# Patient Record
Sex: MALE | Race: WHITE
[De-identification: names, ages, dates, MRNs, and addresses within clinical notes are randomized per-mention and may not be internally consistent; named-entity substitution may affect disease eponyms.]

---

## 2018-12-11 ENCOUNTER — HOSPITAL ENCOUNTER (EMERGENCY)
Dept: HOSPITAL 89 - ER | Age: 33
Discharge: HOME | End: 2018-12-11
Payer: SELF-PAY

## 2018-12-11 VITALS — DIASTOLIC BLOOD PRESSURE: 84 MMHG | SYSTOLIC BLOOD PRESSURE: 110 MMHG

## 2018-12-11 DIAGNOSIS — R00.2: Primary | ICD-10-CM

## 2018-12-11 LAB — PLATELET COUNT, AUTOMATED: 386 K/UL (ref 150–450)

## 2018-12-11 PROCEDURE — 84460 ALANINE AMINO (ALT) (SGPT): CPT

## 2018-12-11 PROCEDURE — 84520 ASSAY OF UREA NITROGEN: CPT

## 2018-12-11 PROCEDURE — 85025 COMPLETE CBC W/AUTO DIFF WBC: CPT

## 2018-12-11 PROCEDURE — 82040 ASSAY OF SERUM ALBUMIN: CPT

## 2018-12-11 PROCEDURE — 82947 ASSAY GLUCOSE BLOOD QUANT: CPT

## 2018-12-11 PROCEDURE — 84155 ASSAY OF PROTEIN SERUM: CPT

## 2018-12-11 PROCEDURE — 82247 BILIRUBIN TOTAL: CPT

## 2018-12-11 PROCEDURE — 71046 X-RAY EXAM CHEST 2 VIEWS: CPT

## 2018-12-11 PROCEDURE — 82374 ASSAY BLOOD CARBON DIOXIDE: CPT

## 2018-12-11 PROCEDURE — 84132 ASSAY OF SERUM POTASSIUM: CPT

## 2018-12-11 PROCEDURE — 84295 ASSAY OF SERUM SODIUM: CPT

## 2018-12-11 PROCEDURE — 82310 ASSAY OF CALCIUM: CPT

## 2018-12-11 PROCEDURE — 93005 ELECTROCARDIOGRAM TRACING: CPT

## 2018-12-11 PROCEDURE — 84075 ASSAY ALKALINE PHOSPHATASE: CPT

## 2018-12-11 PROCEDURE — 84484 ASSAY OF TROPONIN QUANT: CPT

## 2018-12-11 PROCEDURE — 84450 TRANSFERASE (AST) (SGOT): CPT

## 2018-12-11 PROCEDURE — 82435 ASSAY OF BLOOD CHLORIDE: CPT

## 2018-12-11 PROCEDURE — 99284 EMERGENCY DEPT VISIT MOD MDM: CPT

## 2018-12-11 PROCEDURE — 85379 FIBRIN DEGRADATION QUANT: CPT

## 2018-12-11 PROCEDURE — 82565 ASSAY OF CREATININE: CPT

## 2018-12-11 NOTE — ER REPORT
History and Physical


Time Seen By MD:  13:42


HPI/ROS


CHIEF COMPLAINT: Heart fluttering





HISTORY OF PRESENT ILLNESS: This is a 33-year-old male who presents to the 


emergency Department for heart fluttering. Patient states over the last 3 days 


he's noticed his left anterior chest he's had heart fluttering, no associated 


shortness of breath or chest pain. He is not currently taking any stimulants, 


has cut out coffee, became concerned that this is increasing in frequency, 


therefore decided to come in for further evaluation. No recent fevers or chills.


No nausea or vomiting. No diaphoresis. No visual changes or rashes. No 


headaches. The patient is also an over the road .





REVIEW OF SYSTEMS:


Constitutional: No fever, no chills.


Eyes: No discharge.


ENT: No sore throat. 


Cardiovascular: As above.


Respiratory: No cough, no shortness of breath.


Gastrointestinal: No abdominal pain, no vomiting.


Genitourinary: No hematuria.


Musculoskeletal: No back pain.


Skin: No rashes.


Neurological: No headache.


Allergies:  


Coded Allergies:  


     lactose (Verified  Allergy, Intermediate, diarrhea, 12/11/18)


   


   lactose intolerance


Past Medical/Surgical History


The patient has no significant past medical or surgical history.


Reviewed Nurses Notes:  Yes


Constitutional





Vital Sign - Last 24 Hours








 12/11/18 12/11/18 12/11/18 12/11/18





 13:44 13:44 13:49 13:53


 


Pulse  90 89 


 


Resp   6 


 


B/P (MAP) 147/95 (112) 147/95  


 


Pulse Ox  95 95 


 


O2 Delivery  Nasal Cannula  


 


O2 Flow Rate    2.0





 12/11/18 12/11/18 12/11/18 12/11/18





 14:00 14:04 14:30 14:34


 


Pulse  92  85


 


Resp  15  7


 


B/P (MAP) 141/90 (107)  127/73 (91) 


 


Pulse Ox  93  95





 12/11/18 12/11/18 12/11/18 12/11/18





 14:39 14:49 15:00 15:04


 


Pulse  90  87


 


Resp  11  11


 


B/P (MAP) 130/86 (101)  110/84 (93) 


 


Pulse Ox  94  95





 12/11/18   





 15:19   


 


Pulse 84   


 


Resp 26   


 


Pulse Ox 95   








Physical Exam


   General Appearance: The patient is alert, has no immediate need for airway 


protection and no signs of toxicity. 


Eyes: Pupils equal and round no pallor or injection.


ENT, Mouth: Mucous membranes are moist.


Respiratory: There are no retractions, lungs are clear to auscultation.


Cardiovascular: Regular rate and rhythm, no murmurs, clicks or rubs.


Gastrointestinal:  Abdomen is soft and non tender, no masses, bowel sounds sybil


l.


Neurological: Alert and oriented 4. Moving all extremities. Following all 


commands. No focal neuro deficits.


Skin: Warm and dry, no rashes.


Musculoskeletal:  Neck is supple non tender.


      Extremities are nontender, nonswollen and have full range of motion.





DIFFERENTIAL DIAGNOSIS: After history and physical exam differential diagnosis 


was considered for chest pain including but not limited to myocardial ischemia, 


pericarditis pulmonary embolus, chest wall pain, pleural inflammation and pu


lmonary infectious causes.





Medical Decision Making


Data Points


Result Diagram:  


12/11/18 1409                                                                   


            12/11/18 1409





Laboratory





Hematology








Test


 12/11/18


14:09


 


Red Blood Count


 5.33 M/uL


(4.00-5.60)


 


Mean Corpuscular Volume


 82.4 fL


(80.0-96.0)


 


Mean Corpuscular Hemoglobin


 27.5 pg


(26.0-33.0)


 


Mean Corpuscular Hemoglobin


Concent 33.4 g/dL


(32.0-36.0)


 


Red Cell Distribution Width


 13.4 %


(11.5-14.5)


 


Mean Platelet Volume


 8.3 fL


(7.2-11.1)


 


Neutrophils (%) (Auto)


 71.2 %


(39.4-72.5)


 


Lymphocytes (%) (Auto)


 20.9 %


(17.6-49.6)


 


Monocytes (%) (Auto)


 6.6 %


(4.1-12.4)


 


Eosinophils (%) (Auto)


 0.7 %


(0.4-6.7)


 


Basophils (%) (Auto)


 0.6 %


(0.3-1.4)


 


Nucleated RBC Relative Count


(auto) 0.0 /100WBC 





 


Neutrophils # (Auto)


 6.7 K/uL


(2.0-7.4)


 


Lymphocytes # (Auto)


 2.0 K/uL


(1.3-3.6)


 


Monocytes # (Auto)


 0.6 K/uL


(0.3-1.0)


 


Eosinophils # (Auto)


 0.1 K/uL


(0.0-0.5)


 


Basophils # (Auto)


 0.1 K/uL


(0.0-0.1)


 


Nucleated RBC Absolute Count


(auto) 0.00 K/uL 





 


D-Dimer Quantitative (PE/DVT)


 < 0.27 ug/ml


(0-0.50)


 


Sodium Level


 142 mmol/L


(137-145)


 


Potassium Level


 3.9 mmol/L


(3.5-5.0)


 


Chloride Level


 106 mmol/L


()


 


Carbon Dioxide Level


 26 mmol/L


(22-30)


 


Blood Urea Nitrogen 8 mg/dl (9-21) 


 


Creatinine


 0.90 mg/dl


(0.66-1.25)


 


Glomerular Filtration Rate


Calc > 60.0 





 


Random Glucose


 99 mg/dl


()


 


Calcium Level


 9.4 mg/dl


(8.4-10.2)


 


Total Bilirubin


 0.8 mg/dl


(0.2-1.3)


 


Aspartate Amino Transf


(AST/SGOT) 34 U/L (0-35) 





 


Alanine Aminotransferase


(ALT/SGPT) 58 U/L (0-56) 





 


Alkaline Phosphatase 74 U/L (0-126) 


 


Troponin I < 0.012 ng/ml 


 


Total Protein


 8.0 g/dl


(6.3-8.2)


 


Albumin


 4.6 g/dl


(3.5-5.0)








Chemistry








Test


 12/11/18


14:09


 


White Blood Count


 9.4 k/uL


(4.5-11.0)


 


Red Blood Count


 5.33 M/uL


(4.00-5.60)


 


Hemoglobin


 14.6 g/dL


(14.0-18.0)


 


Hematocrit


 43.9 %


(42.0-52.0)


 


Mean Corpuscular Volume


 82.4 fL


(80.0-96.0)


 


Mean Corpuscular Hemoglobin


 27.5 pg


(26.0-33.0)


 


Mean Corpuscular Hemoglobin


Concent 33.4 g/dL


(32.0-36.0)


 


Red Cell Distribution Width


 13.4 %


(11.5-14.5)


 


Platelet Count


 386 K/uL


(150-450)


 


Mean Platelet Volume


 8.3 fL


(7.2-11.1)


 


Neutrophils (%) (Auto)


 71.2 %


(39.4-72.5)


 


Lymphocytes (%) (Auto)


 20.9 %


(17.6-49.6)


 


Monocytes (%) (Auto)


 6.6 %


(4.1-12.4)


 


Eosinophils (%) (Auto)


 0.7 %


(0.4-6.7)


 


Basophils (%) (Auto)


 0.6 %


(0.3-1.4)


 


Nucleated RBC Relative Count


(auto) 0.0 /100WBC 





 


Neutrophils # (Auto)


 6.7 K/uL


(2.0-7.4)


 


Lymphocytes # (Auto)


 2.0 K/uL


(1.3-3.6)


 


Monocytes # (Auto)


 0.6 K/uL


(0.3-1.0)


 


Eosinophils # (Auto)


 0.1 K/uL


(0.0-0.5)


 


Basophils # (Auto)


 0.1 K/uL


(0.0-0.1)


 


Nucleated RBC Absolute Count


(auto) 0.00 K/uL 





 


D-Dimer Quantitative (PE/DVT)


 < 0.27 ug/ml


(0-0.50)


 


Glomerular Filtration Rate


Calc > 60.0 





 


Calcium Level


 9.4 mg/dl


(8.4-10.2)


 


Total Bilirubin


 0.8 mg/dl


(0.2-1.3)


 


Aspartate Amino Transf


(AST/SGOT) 34 U/L (0-35) 





 


Alanine Aminotransferase


(ALT/SGPT) 58 U/L (0-56) 





 


Alkaline Phosphatase 74 U/L (0-126) 


 


Troponin I < 0.012 ng/ml 


 


Total Protein


 8.0 g/dl


(6.3-8.2)


 


Albumin


 4.6 g/dl


(3.5-5.0)








Coagulation








Test


 12/11/18


14:09


 


D-Dimer Quantitative (PE/DVT) < 0.27 ug/ml 











EKG/Imaging


EKG Interpretation


12 lead EKG: Time of EKG 1401


      Rhythm: Normal sinus rhythm, ventricular rate 96 bpm.


      Axis: normal 


      QRS: normal


      ST segments: No ST depression or elevation identified, .


No previous EKGs for comparison.


Imaging


ACCESSION #: [id number]


 


2 VIEWS CHEST


 


INDICATION: Chest pain and shortness of breath.


 


COMPARISON: None available


 


FINDINGS:


Cardiomediastinal silhouette and pulmonary vessels within normal limits.


There is no focal infiltrate or lobar consolidation.


There is no pneumothorax or pleural effusion.


No nodule.


Upper abdomen is unremarkable. No acute bony abnormality.


 


IMPRESSION:


1. No acute cardiopulmonary process.


 


Report Dictated By: Ata Holder at 12/11/2018 2:54 PM


 


Report E-Signed By: Ata Holder  at 12/11/2018 2:56 PM


 


WSN:[id number]





ED Course/Re-evaluation


Clinical Indication for ER IV:  IV Access


ED Course


The patient was admitted to a room. A history and physical were obtained. 


Differential diagnoses were considered. An IV was started. A CBC, CMP, troponin 


and d-dimer were obtained. Lab studies are unremarkable, negative d-dimer, ne


gative troponin. EKG showing normal sinus rhythm, no ST depression or elevation.


Two-view chest x-ray was negative for any acute pulmonary process. I reviewed 


these results with the patient, I did tell him I don't have a clear exhalation 


is placed having these palpitations, no palpitations were noted on the bedside 


monitor today. I did tell patient I felt comfortable sending him home as there 


are no concerning findings at this time. I did however recommend following up 


with his primary care provider for a Holter monitor. Patient will also follow up


in the nearest emergency department should he have recurrent palpitations, chest


pain or shortness of breath. Patient was in agreement with this plan of care and


discharged home. Patient was also given 324 mg chewable aspirin.


Decision to Disposition Date:  Dec 11, 2018


Decision to Disposition Time:  15:23





Depart


Departure


Latest Vital Signs





Vital Signs








  Date Time  Temp Pulse Resp B/P (MAP) Pulse Ox O2 Delivery O2 Flow Rate FiO2


 


12/11/18 15:19  84 26  95   


 


12/11/18 15:00    110/84 (93)    


 


12/11/18 13:53       2.0 


 


12/11/18 13:44      Nasal Cannula  








Impression:  


   Primary Impression:  


   Intermittent palpitations


Condition:  Improved


Disposition:  HOME OR SELF-CARE


Patient Instructions:  Holter Monitoring (ED), Palpitations (ED)





Additional Instructions:  


No concerning findings noted on your blood work, EKG or chest Xray today.


I am unsure what is causing your palpitations/fluttering, therefore I highly 


recommend following up with your primary care provider when you return home and 


discuss the palpitations and a holter monitor. 


Continue drinking plenty of water.


Get plenty of rest.


Return to the ED for any other concerns or worsening symptoms.











SUMANTH NORTON FNP-BC      Dec 11, 2018 13:43

## 2018-12-11 NOTE — EKG
FACILITY: [facility] [place] 

 

PATIENT NAME: MARY KATE LIMA

: [id number]

MR: [id number]

V: [id number]

EXAM DATE: 

ORDERING PHYSICIAN: SUMANTH NORTON

TECHNOLOGIST: 

 

Test Reason : heart fluttering for past 3 days

Blood Pressure : ***/*** mmHG

Vent. Rate : 096 BPM     Atrial Rate : 096 BPM

   P-R Int : 148 ms          QRS Dur : 098 ms

    QT Int : 380 ms       P-R-T Axes : 043 022 063 degrees

   QTc Int : 480 ms

 

Normal sinus rhythm

No ST-T abnormalities

No previous ECGs available

Confirmed by NAHID SAUL ([id number]) on 2018 4:43:44 PM

 

Referred By:             Confirmed By:NAHID SAUL

## 2018-12-11 NOTE — RADIOLOGY IMAGING REPORT
FACILITY: [facility] 

 

PATIENT NAME: Roland Spears

: 1985

MR: [id number]

V: [id number]

EXAM DATE: 

ORDERING PHYSICIAN: SUMANTH NORTON

TECHNOLOGIST: 

 

Location: [facility] [place]

Patient: Roland Spears

: 1985

MRN: [id number]

Visit/Account:[id number]

Date of Sevice: 2018

 

ACCESSION #: [id number]

 

2 VIEWS CHEST

 

INDICATION: Chest pain and shortness of breath.

 

COMPARISON: None available

 

FINDINGS:

Cardiomediastinal silhouette and pulmonary vessels within normal limits.

There is no focal infiltrate or lobar consolidation.

There is no pneumothorax or pleural effusion.

No nodule.

Upper abdomen is unremarkable. No acute bony abnormality.

 

IMPRESSION:

1. No acute cardiopulmonary process.

 

Report Dictated By: Ata Holder at 2018 2:54 PM

 

Report E-Signed By: Ata Holder  at 2018 2:56 PM

 

WSN:[id number]